# Patient Record
Sex: FEMALE | Race: WHITE
[De-identification: names, ages, dates, MRNs, and addresses within clinical notes are randomized per-mention and may not be internally consistent; named-entity substitution may affect disease eponyms.]

---

## 2017-03-01 ENCOUNTER — HOSPITAL ENCOUNTER (EMERGENCY)
Dept: HOSPITAL 53 - M ED | Age: 22
Discharge: HOME | End: 2017-03-01
Payer: COMMERCIAL

## 2017-03-01 VITALS — HEIGHT: 68 IN | WEIGHT: 124 LBS | BODY MASS INDEX: 18.79 KG/M2

## 2017-03-01 VITALS — DIASTOLIC BLOOD PRESSURE: 68 MMHG | SYSTOLIC BLOOD PRESSURE: 113 MMHG

## 2017-03-01 DIAGNOSIS — Z79.3: ICD-10-CM

## 2017-03-01 DIAGNOSIS — N83.299: Primary | ICD-10-CM

## 2017-03-01 DIAGNOSIS — I51.9: ICD-10-CM

## 2017-03-01 DIAGNOSIS — Z88.2: ICD-10-CM

## 2017-03-01 LAB
ALBUMIN SERPL BCG-MCNC: 3.7 GM/DL (ref 3.2–5.2)
ALBUMIN/GLOB SERPL: 1.09 {RATIO} (ref 1–1.93)
ALP SERPL-CCNC: 47 U/L (ref 45–117)
ALT SERPL W P-5'-P-CCNC: 14 U/L (ref 12–78)
ANION GAP SERPL CALC-SCNC: 7 MEQ/L (ref 8–16)
AST SERPL-CCNC: 9 U/L (ref 15–37)
BASOPHILS # BLD AUTO: 0 K/MM3 (ref 0–0.2)
BASOPHILS NFR BLD AUTO: 0.6 % (ref 0–1)
BILIRUB CONJ SERPL-MCNC: < 0.1 MG/DL (ref 0–0.2)
BILIRUB SERPL-MCNC: 0.3 MG/DL (ref 0.2–1)
BUN SERPL-MCNC: 8 MG/DL (ref 7–18)
CALCIUM SERPL-MCNC: 8.6 MG/DL (ref 8.5–10.1)
CHLORIDE SERPL-SCNC: 109 MEQ/L (ref 98–107)
CO2 SERPL-SCNC: 27 MEQ/L (ref 21–32)
CONTROL LINE HCG: (no result)
CREAT SERPL-MCNC: 0.69 MG/DL (ref 0.55–1.02)
EOSINOPHIL # BLD AUTO: 0 K/MM3 (ref 0–0.5)
EOSINOPHIL NFR BLD AUTO: 1.5 % (ref 0–3)
ERYTHROCYTE [DISTWIDTH] IN BLOOD BY AUTOMATED COUNT: 12 % (ref 11.5–14.5)
GFR SERPL CREATININE-BSD FRML MDRD: > 60 ML/MIN/{1.73_M2} (ref 60–?)
GLUCOSE SERPL-MCNC: 89 MG/DL (ref 70–105)
LARGE UNSTAINED CELL #: 0.1 K/MM3 (ref 0–0.4)
LARGE UNSTAINED CELL %: 2.3 % (ref 0–4)
LYMPHOCYTES # BLD AUTO: 1.6 K/MM3 (ref 1.5–6.5)
LYMPHOCYTES NFR BLD AUTO: 46.6 % (ref 24–44)
MCH RBC QN AUTO: 30.8 PG (ref 27–33)
MCHC RBC AUTO-ENTMCNC: 34.2 G/DL (ref 32–36.5)
MCV RBC AUTO: 90 FL (ref 80–96)
MONOCYTES # BLD AUTO: 0.2 K/MM3 (ref 0–0.8)
MONOCYTES NFR BLD AUTO: 5.4 % (ref 0–5)
NEUTROPHILS # BLD AUTO: 1.5 K/MM3 (ref 1.8–7.7)
NEUTROPHILS NFR BLD AUTO: 43.5 % (ref 36–66)
PLATELET # BLD AUTO: 246 K/MM3 (ref 150–450)
POTASSIUM SERPL-SCNC: 3.6 MEQ/L (ref 3.5–5.1)
PROT SERPL-MCNC: 7.1 GM/DL (ref 6.4–8.2)
SODIUM SERPL-SCNC: 143 MEQ/L (ref 136–145)
WBC # BLD AUTO: 3.4 K/MM3 (ref 4–10)

## 2017-03-01 PROCEDURE — 96374 THER/PROPH/DIAG INJ IV PUSH: CPT

## 2017-03-01 PROCEDURE — 76830 TRANSVAGINAL US NON-OB: CPT

## 2017-03-01 PROCEDURE — 80048 BASIC METABOLIC PNL TOTAL CA: CPT

## 2017-03-01 PROCEDURE — 93041 RHYTHM ECG TRACING: CPT

## 2017-03-01 PROCEDURE — 85025 COMPLETE CBC W/AUTO DIFF WBC: CPT

## 2017-03-01 PROCEDURE — 84703 CHORIONIC GONADOTROPIN ASSAY: CPT

## 2017-03-01 PROCEDURE — 76856 US EXAM PELVIC COMPLETE: CPT

## 2017-03-01 PROCEDURE — 83690 ASSAY OF LIPASE: CPT

## 2017-03-01 PROCEDURE — 80076 HEPATIC FUNCTION PANEL: CPT

## 2017-03-01 PROCEDURE — 96375 TX/PRO/DX INJ NEW DRUG ADDON: CPT

## 2017-03-01 PROCEDURE — 93976 VASCULAR STUDY: CPT

## 2017-03-01 PROCEDURE — 99284 EMERGENCY DEPT VISIT MOD MDM: CPT

## 2017-03-02 ENCOUNTER — HOSPITAL ENCOUNTER (EMERGENCY)
Dept: HOSPITAL 53 - M ED | Age: 22
Discharge: HOME | End: 2017-03-02
Payer: COMMERCIAL

## 2017-03-02 VITALS — WEIGHT: 124 LBS | BODY MASS INDEX: 18.79 KG/M2 | HEIGHT: 68 IN

## 2017-03-02 VITALS — DIASTOLIC BLOOD PRESSURE: 62 MMHG | SYSTOLIC BLOOD PRESSURE: 102 MMHG

## 2017-03-02 DIAGNOSIS — N30.90: ICD-10-CM

## 2017-03-02 DIAGNOSIS — N10: Primary | ICD-10-CM

## 2017-03-02 DIAGNOSIS — N83.291: ICD-10-CM

## 2017-03-02 DIAGNOSIS — Z88.2: ICD-10-CM

## 2017-03-02 DIAGNOSIS — I49.9: ICD-10-CM

## 2017-03-02 DIAGNOSIS — Z88.1: ICD-10-CM

## 2017-03-02 LAB
ALBUMIN SERPL BCG-MCNC: 4 GM/DL (ref 3.2–5.2)
ALBUMIN/GLOB SERPL: 1.14 {RATIO} (ref 1–1.93)
ALP SERPL-CCNC: 53 U/L (ref 45–117)
ALT SERPL W P-5'-P-CCNC: 14 U/L (ref 12–78)
ANION GAP SERPL CALC-SCNC: 8 MEQ/L (ref 8–16)
AST SERPL-CCNC: 11 U/L (ref 15–37)
BASOPHILS # BLD AUTO: 0 K/MM3 (ref 0–0.2)
BASOPHILS NFR BLD AUTO: 0.3 % (ref 0–1)
BILIRUB CONJ SERPL-MCNC: 0.2 MG/DL (ref 0–0.2)
BILIRUB SERPL-MCNC: 0.7 MG/DL (ref 0.2–1)
BUN SERPL-MCNC: 8 MG/DL (ref 7–18)
CALCIUM SERPL-MCNC: 9 MG/DL (ref 8.5–10.1)
CHLORIDE SERPL-SCNC: 107 MEQ/L (ref 98–107)
CO2 SERPL-SCNC: 27 MEQ/L (ref 21–32)
CONTROL LINE HCG: (no result)
CREAT SERPL-MCNC: 0.78 MG/DL (ref 0.55–1.02)
EOSINOPHIL # BLD AUTO: 0.1 K/MM3 (ref 0–0.5)
EOSINOPHIL NFR BLD AUTO: 0.8 % (ref 0–3)
ERYTHROCYTE [DISTWIDTH] IN BLOOD BY AUTOMATED COUNT: 12.1 % (ref 11.5–14.5)
GFR SERPL CREATININE-BSD FRML MDRD: > 60 ML/MIN/{1.73_M2} (ref 60–?)
GLUCOSE SERPL-MCNC: 83 MG/DL (ref 70–105)
LARGE UNSTAINED CELL #: 0.1 K/MM3 (ref 0–0.4)
LARGE UNSTAINED CELL %: 1 % (ref 0–4)
LYMPHOCYTES # BLD AUTO: 1.7 K/MM3 (ref 1.5–6.5)
LYMPHOCYTES NFR BLD AUTO: 23.4 % (ref 24–44)
MCH RBC QN AUTO: 30.7 PG (ref 27–33)
MCHC RBC AUTO-ENTMCNC: 33.5 G/DL (ref 32–36.5)
MCV RBC AUTO: 91.7 FL (ref 80–96)
MONOCYTES # BLD AUTO: 0.2 K/MM3 (ref 0–0.8)
MONOCYTES NFR BLD AUTO: 3.3 % (ref 0–5)
NEUTROPHILS # BLD AUTO: 5.2 K/MM3 (ref 1.8–7.7)
NEUTROPHILS NFR BLD AUTO: 71.3 % (ref 36–66)
PLATELET # BLD AUTO: 274 K/MM3 (ref 150–450)
POTASSIUM SERPL-SCNC: 3.9 MEQ/L (ref 3.5–5.1)
PROT SERPL-MCNC: 7.5 GM/DL (ref 6.4–8.2)
SODIUM SERPL-SCNC: 142 MEQ/L (ref 136–145)
WBC # BLD AUTO: 7.2 K/MM3 (ref 4–10)

## 2017-03-02 PROCEDURE — 99282 EMERGENCY DEPT VISIT SF MDM: CPT

## 2017-03-02 PROCEDURE — 81001 URINALYSIS AUTO W/SCOPE: CPT

## 2017-03-02 PROCEDURE — 84703 CHORIONIC GONADOTROPIN ASSAY: CPT

## 2017-03-02 PROCEDURE — 80076 HEPATIC FUNCTION PANEL: CPT

## 2017-03-02 PROCEDURE — 96361 HYDRATE IV INFUSION ADD-ON: CPT

## 2017-03-02 PROCEDURE — 74177 CT ABD & PELVIS W/CONTRAST: CPT

## 2017-03-02 PROCEDURE — 96375 TX/PRO/DX INJ NEW DRUG ADDON: CPT

## 2017-03-02 PROCEDURE — 96374 THER/PROPH/DIAG INJ IV PUSH: CPT

## 2017-03-02 PROCEDURE — 85025 COMPLETE CBC W/AUTO DIFF WBC: CPT

## 2017-03-02 PROCEDURE — 80048 BASIC METABOLIC PNL TOTAL CA: CPT

## 2017-03-02 PROCEDURE — 83690 ASSAY OF LIPASE: CPT

## 2017-03-02 NOTE — REP
CT ABDOMEN PELVIS WITH IV CONTRAST:  03/02/2017

 

COMPARISON: Pelvic ultrasound 03/01/2017, CT abdomen and pelvis 08/01/2016.

 

CLINICAL HISTORY:  Right-sided abdominal pain.  Ultrasound yesterday showed 3.5

cm right ovarian cyst.  No free fluid.

 

TECHNIQUE:  No oral contrast given per ED request.  Given low body fat index,

this can make determination of pathology more difficult in the right lower

quadrant.  The patient received a bolus of 100 mL  Isovue 370 and scanning

through the abdomen pelvis with coronal and sagittal reconstructions.  Bone

windows also reviewed.

 

CT ABDOMEN: Lung bases are clear.  There is no effusion or infiltrate.  The heart

is not enlarged. There is no pericardial thickening or effusion and no hiatal

hernia.  Liver, spleen, gallbladder, pancreas, adrenal glands and the kidneys are

normal in appearance.  Stomach with a  small amount of retained fluid but no mass

or wall thickening.  Small bowel loops in the abdomen proper are unremarkable.

Moderate stool in the right colon with a small amount of stool and gas distending

the transverse colon to the splenic flexure, descending colon collapsed.  The

aorta is without aneurysm or dissection.  There is no periaortic or other

retroperitoneal pathologic sized lymphadenopathy.  Bone windows show lumbar and

lower thoracic spine, posterior elements and visualized ribs all intact.  Lung

window review of all slices shows no perforation or free air.

 

CT PELVIS:  No pelvic free air or ascites.  The bone windows show SI joints,

sacrum, iliac bones, acetabuli,  hips and pubic rami grossly intact.  No

hydronephrosis, hydroureter, renal or ureteral stone.  The bladder shows no wall

thickening, stone or mass.  Small bowel loops in the deep pelvis are fluid

filled, slightly dilated suggesting some gastroenteritis or focal ileus.  The

distal left colon shows collapse, some wall thickening and infiltration of fat

adjacent suggesting some mild colitis.  This continues into the sigmoid and

rectosigmoid.  In the deep pelvis there is a trace amount of fluid between the

rectum and uterus towards the right, which was not present on ultrasound

yesterday.  There is a 3 cm cyst in the right adnexa region. Yesterday's cyst

measured 3.5 cm on maximum diameter on ultrasound.  No ventral or inguinal hernia

nor pathologic inguinal adenopathy.  Uterus not enlarged.  Left ovary

unremarkable.

 

Appendix is difficult to clearly identified.  No gross inflammatory change, fluid

collection, perforation, abscess or mass associated with it.

 

IMPRESSION:

1.  Trace free fluid in the cul-de-sac to the right of midline with a 3 cm cyst

in the right ovary seen is a 3.5 cm since yesterday on ultrasound.

 

2.  Thickening of the wall and collapse of the distal left colon with sigmoid and

rectum also showing some thickening of wall appearance and pericolonic fat and

perirectal fat with mild infiltration.  This suggests some colitis and

proctosigmoiditis.  Remainder of the colon intact.

 

3.  Small bowel loops in the pelvis fluid-filled, slightly dilated suggesting

ileus.

 

4.  No free air, abscess, perforation or definite evidence of appendicitis.

 

 

Signed by

Dale So MD 03/02/2017 04:26 P

## 2017-03-02 NOTE — REP
PELVIC ULTRASOUND AND ENDOVAGINAL PROBE ULTRASOUND: 03/01/2017.

 

Clinical history: 21-year-old with pelvic pain.

 

Comparison: CT abdomen and pelvis 08/01/2016.

 

Findings:  Transabdominal and endovaginal probe images were performed. The

bladder measures 8.6 x 6.7 x 4.8 cm.  Uterus is anteverted and measures 8.3 x 3.5

x 4.8 cm.  It has an endometrial echogenic stripe which is central in location,

thin at 1.8 mm and 2.3 mm on the endovaginal probe images.  No fluid in

endometrial cavity or endocervical canal.  Uterus appears homogeneous and without

mass or contour abnormality.  There is no pelvic free fluid.

 

The right ovary is 3.7 x 2.4 x 2.8 cm.  There is a simple cyst in the right ovary

3.5 x 2.5 x 2.1 cm. The left ovary measures 2.2 x 2.2 x 1.8 cm.

 

Doppler interrogation shows resistive index 0.48 on the left and 0.50 on the

right.  No torsion.

 

Impression:

 

1.  3.5 x 2.5 cm simple cyst right ovary with normal blood flow to both ovaries

and no evidence of torsion.  No free fluid or solid mass.

 

2. The uterus and endometrial stripe normal.

 

 

Signed by

Dale So MD 03/02/2017 04:18 P

## 2017-07-10 ENCOUNTER — HOSPITAL ENCOUNTER (OUTPATIENT)
Dept: HOSPITAL 53 - M LABDRAWC | Age: 22
End: 2017-07-10
Attending: NURSE PRACTITIONER
Payer: COMMERCIAL

## 2017-07-10 DIAGNOSIS — K52.9: ICD-10-CM

## 2017-07-10 DIAGNOSIS — R12: Primary | ICD-10-CM

## 2017-07-10 DIAGNOSIS — R93.3: ICD-10-CM

## 2017-07-10 DIAGNOSIS — R11.0: ICD-10-CM

## 2017-07-10 DIAGNOSIS — K59.00: ICD-10-CM

## 2017-07-13 LAB — WHOLE EGG IGE QN: < 0.1 KU/L

## 2017-08-17 ENCOUNTER — HOSPITAL ENCOUNTER (OUTPATIENT)
Dept: HOSPITAL 53 - M LAB REF | Age: 22
End: 2017-08-17
Attending: PHYSICIAN ASSISTANT
Payer: COMMERCIAL

## 2017-08-17 DIAGNOSIS — Z01.812: Primary | ICD-10-CM

## 2017-08-17 LAB
ADD MANUAL DIFFER: YES
ANION GAP SERPL CALC-SCNC: 6 MEQ/L (ref 8–16)
BUN SERPL-MCNC: 11 MG/DL (ref 7–18)
CALCIUM SERPL-MCNC: 9.2 MG/DL (ref 8.5–10.1)
CHLORIDE SERPL-SCNC: 107 MEQ/L (ref 98–107)
CO2 SERPL-SCNC: 28 MEQ/L (ref 21–32)
CREAT SERPL-MCNC: 0.72 MG/DL (ref 0.55–1.02)
EOSINOPHIL NFR BLD MANUAL: 2 % (ref 0–5)
ERYTHROCYTE [DISTWIDTH] IN BLOOD BY AUTOMATED COUNT: 12 % (ref 11.5–14.5)
GFR SERPL CREATININE-BSD FRML MDRD: > 60 ML/MIN/{1.73_M2} (ref 60–?)
GLUCOSE SERPL-MCNC: 99 MG/DL (ref 70–105)
MCH RBC QN AUTO: 33 PG (ref 27–33)
MCHC RBC AUTO-ENTMCNC: 35.2 G/DL (ref 32–36.5)
MCV RBC AUTO: 93.7 FL (ref 80–96)
PLATELET # BLD AUTO: 232 K/MM3 (ref 150–450)
POTASSIUM SERPL-SCNC: 3.9 MEQ/L (ref 3.5–5.1)
SODIUM SERPL-SCNC: 141 MEQ/L (ref 136–145)
WBC # BLD AUTO: 2.6 K/MM3 (ref 4–10)

## 2017-09-06 ENCOUNTER — HOSPITAL ENCOUNTER (OUTPATIENT)
Dept: HOSPITAL 53 - M LABDRAWC | Age: 22
End: 2017-09-06
Attending: PHYSICIAN ASSISTANT
Payer: COMMERCIAL

## 2017-09-06 DIAGNOSIS — D72.818: Primary | ICD-10-CM

## 2017-09-06 LAB
ANISOCYTOSIS BLD QL SMEAR: (no result)
BASOPHILS NFR BLD MANUAL: 1 % (ref 0–4)
EOSINOPHIL NFR BLD MANUAL: 2 % (ref 0–5)
ERYTHROCYTE [DISTWIDTH] IN BLOOD BY AUTOMATED COUNT: 11.5 % (ref 11.5–14.5)
MCH RBC QN AUTO: 32.9 PG (ref 27–33)
MCHC RBC AUTO-ENTMCNC: 35.2 G/DL (ref 32–36.5)
MCV RBC AUTO: 93.4 FL (ref 80–96)
WBC # BLD AUTO: 3.6 K/MM3 (ref 4–10)

## 2019-01-22 NOTE — POST-OPPD
Postoperative Procedure Note


Date Of Procedure:  Jan 22, 2019


PREOPERATIVE DIAGNOSIS: Left forearm tattoo   





POSTOPERATIVE DIAGNOSIS: same





FINDINGS: longitudinally oriented tattoo from antecubital fossa to wrist. 





PROCEDURE: Removal of left forearm tattoo





SURGEON: Dr Vail





ASSISTANT: none





ANESTHESIA: General





SPECIMENS: Left forearm tattoo skin





ESTIMATED BLOOD LOSS: 5 cc





REPLACED: none





DRAINS: none





COMPLICATIONS: none





POSTOPERATIVE CONDITION: Stable











DES VAIL DO              Jan 22, 2019 09:10

## 2019-01-22 NOTE — RO
DATE OF PROCEDURE:  01/22/2019

 

PREPROCEDURE DIAGNOSIS:  Left forearm tattoo.

 

POSTPROCEDURE DIAGNOSIS:  Left forearm tattoo.

 

OPERATIVE PROCEDURE:  Removal of the left forearm tattoo.

 

SURGEON:  Caty Barragan DO

 

ASSISTANT:  None

 

ANESTHESIA:  General.

 

SPECIMENS: Send left forearm tattoo skin.

 

ESTIMATED BLOOD LOSS: 5 mL

 

REPLACED: None

 

DRAINS: None

 

COMPLICATIONS: None.

 

POSTOPERATIVE CONDITION: Stable.

 

DESCRIPTION OF PROCEDURE:   This is a 23-year-old female who presented to my

office. She has a long tattoo. It is a written line starting from the antecubital

fossa until the beginning of the wrist. Patient needs to have this removed. She

attempted the laser, however, the ink is very deep and it is a full thickness, so

she is going to have to have it excised. All the risks, benefits, and

alternatives were discussed with the patient and she is ready to proceed.

 

Today, informed consent was confirmed, the patient is marked in preoperative

holding area.

 

She was brought into the operating room and placed in the supine position.

General anesthesia was induced. Preoperative antibiotics given. Compression

stockings placed on the lower calves. She is prepped and draped in the usual

sterile fashion. We outlined the tattoo throughout where the incision is going to

be and started our incision on inferior or ulnar side of the tattoo carried out

with a #10 blade. Then, the flap was raised superiorly and radially to undermine

the skin where the tattoo is in full length. Hemostasis obtained using

electrocautery. Then, the radial part of the tattoo was excised as close to the

ink as possible to minimize the amount of tissue excised and that tissue was sent

to pathology.

 

Then, the edges were undermined inferiorly, laterally, and superiorly and

___________________ were reapproximated with no tension and small excess tissue

is trimmed. The full incision is closed in layers with interrupted #3-0 Monocryl

sutures and #4-0 Monocryl sutures. No tension on the skin at any point.

Steri-Strips, bulky dressing and an Ace were placed.

## 2022-04-28 NOTE — REP
Clinical:  Nontraumatic left knee pain

 

Technique:  AP, lateral, bilateral oblique and sunrise views.

 

Findings:  The osseous structures and joint spaces are intact and normal.  There

is no evidence for acute fracture or dislocation.  No joint effusion is

appreciated.  Surrounding soft tissues are unremarkable.  No subcutaneous

emphysema or radiodense foreign body.

 

Impression:

Normal examination. 28-Apr-2022 22:19